# Patient Record
Sex: MALE | Race: WHITE | NOT HISPANIC OR LATINO | Employment: OTHER | URBAN - METROPOLITAN AREA
[De-identification: names, ages, dates, MRNs, and addresses within clinical notes are randomized per-mention and may not be internally consistent; named-entity substitution may affect disease eponyms.]

---

## 2023-11-01 ENCOUNTER — APPOINTMENT (OUTPATIENT)
Dept: CARDIOLOGY | Facility: CLINIC | Age: 73
End: 2023-11-01
Payer: MEDICARE

## 2023-11-07 ENCOUNTER — OFFICE VISIT (OUTPATIENT)
Dept: CARDIOLOGY | Facility: CLINIC | Age: 73
End: 2023-11-07
Payer: MEDICARE

## 2023-11-07 VITALS
TEMPERATURE: 97.9 F | HEIGHT: 72 IN | DIASTOLIC BLOOD PRESSURE: 57 MMHG | SYSTOLIC BLOOD PRESSURE: 92 MMHG | BODY MASS INDEX: 32.32 KG/M2 | WEIGHT: 238.6 LBS | HEART RATE: 79 BPM

## 2023-11-07 DIAGNOSIS — I25.810 ATHEROSCLEROSIS OF CORONARY ARTERY BYPASS GRAFT OF NATIVE HEART WITHOUT ANGINA PECTORIS: ICD-10-CM

## 2023-11-07 DIAGNOSIS — E78.2 MIXED HYPERLIPIDEMIA: ICD-10-CM

## 2023-11-07 DIAGNOSIS — I25.10 CORONARY ARTERY DISEASE INVOLVING NATIVE HEART, UNSPECIFIED VESSEL OR LESION TYPE, UNSPECIFIED WHETHER ANGINA PRESENT: Primary | ICD-10-CM

## 2023-11-07 DIAGNOSIS — I12.9 HYPERTENSION, RENAL DISEASE, STAGE 1-4 OR UNSPECIFIED CHRONIC KIDNEY DISEASE: ICD-10-CM

## 2023-11-07 PROBLEM — C85.90 LYMPHOMA (MULTI): Status: ACTIVE | Noted: 2023-11-07

## 2023-11-07 PROBLEM — Z95.1 H/O FOUR VESSEL CORONARY ARTERY BYPASS GRAFT: Status: ACTIVE | Noted: 2023-11-07

## 2023-11-07 PROCEDURE — 1160F RVW MEDS BY RX/DR IN RCRD: CPT | Performed by: INTERNAL MEDICINE

## 2023-11-07 PROCEDURE — 93010 ELECTROCARDIOGRAM REPORT: CPT | Performed by: INTERNAL MEDICINE

## 2023-11-07 PROCEDURE — 93005 ELECTROCARDIOGRAM TRACING: CPT | Performed by: INTERNAL MEDICINE

## 2023-11-07 PROCEDURE — 1159F MED LIST DOCD IN RCRD: CPT | Performed by: INTERNAL MEDICINE

## 2023-11-07 PROCEDURE — 99204 OFFICE O/P NEW MOD 45 MIN: CPT | Performed by: INTERNAL MEDICINE

## 2023-11-07 PROCEDURE — 99214 OFFICE O/P EST MOD 30 MIN: CPT | Performed by: INTERNAL MEDICINE

## 2023-11-07 PROCEDURE — 1036F TOBACCO NON-USER: CPT | Performed by: INTERNAL MEDICINE

## 2023-11-07 RX ORDER — METOPROLOL SUCCINATE 25 MG/1
25 TABLET, EXTENDED RELEASE ORAL 2 TIMES DAILY
COMMUNITY
Start: 2023-07-31 | End: 2023-11-13

## 2023-11-07 RX ORDER — AMLODIPINE BESYLATE 5 MG/1
5 TABLET ORAL DAILY
COMMUNITY
Start: 2023-10-20 | End: 2023-11-07 | Stop reason: ALTCHOICE

## 2023-11-07 RX ORDER — ASPIRIN 81 MG/1
81 TABLET ORAL DAILY
COMMUNITY

## 2023-11-07 RX ORDER — TAMOXIFEN CITRATE 20 MG/1
20 TABLET ORAL DAILY
COMMUNITY
Start: 2023-08-31

## 2023-11-07 RX ORDER — ATORVASTATIN CALCIUM 40 MG/1
40 TABLET, FILM COATED ORAL DAILY
COMMUNITY
Start: 2023-10-13 | End: 2024-04-29 | Stop reason: SDUPTHER

## 2023-11-07 NOTE — PROGRESS NOTES
Chief Complaint:   Coronary Artery Disease     History Of Present Illness:    Bryant Romeo is a 73 y.o. male presenting with coronary artery disease.  He had a history of angina and underwent coronary artery bypass surgery.  The patient is tolerating guideline-directed medical therapy with antiplatelet and statin medication and is compliant.  The patient exercises regularly and follows a heart healthy diet.  The patient has been well since their last office appointment and is not having any anginal symptoms or dyspnea on exertion.      Review of Systems  All pertinent systems have been reviewed and are negative except for what is stated in the history of present illness.    All other systems have been reviewed and are negative and noncontributory to this patient's current ailments.   .     Last Recorded Vitals:  Visit Vitals  BP 92/57 (BP Location: Right arm)   Pulse 79   Temp 36.6 °C (97.9 °F)   Ht 1.829 m (6')   Wt 108 kg (238 lb 9.6 oz)   BMI 32.36 kg/m²   Smoking Status Never   BSA 2.34 m²        Past Medical History:  He has a past medical history of Atherosclerosis of coronary artery bypass graft of native heart without angina pectoris (11/07/2023), H/O four vessel coronary artery bypass graft (11/07/2023), Hypertension, renal disease, stage 1-4 or unspecified chronic kidney disease (11/07/2023), and Mixed hyperlipidemia (11/07/2023).    Past Surgical History:  He has no past surgical history on file.      Social History:  He reports that he has never smoked. He has never used smokeless tobacco. No history on file for alcohol use and drug use.    Family History:  No family history on file.     Allergies:  Demerol [meperidine]    Outpatient Medications:  Current Outpatient Medications   Medication Instructions    amLODIPine (NORVASC) 5 mg, oral, Daily    aspirin 81 mg, oral, Daily    atorvastatin (LIPITOR) 40 mg, oral, Daily    metoprolol succinate XL (TOPROL-XL) 25 mg, oral, 2 times daily    tamoxifen  "(NOLVADEX) 20 mg, oral, Daily       Physical Exam:  Physical Exam  Vitals reviewed.   Constitutional:       General: He is not in acute distress.     Appearance: Normal appearance.   HENT:      Head: Normocephalic and atraumatic.      Nose: Nose normal.   Eyes:      Conjunctiva/sclera: Conjunctivae normal.   Cardiovascular:      Rate and Rhythm: Normal rate and regular rhythm.      Pulses: Normal pulses.      Heart sounds: No murmur heard.  Pulmonary:      Effort: Pulmonary effort is normal. No respiratory distress.      Breath sounds: Normal breath sounds. No wheezing, rhonchi or rales.   Abdominal:      General: Bowel sounds are normal. There is no distension.      Palpations: Abdomen is soft.      Tenderness: There is no abdominal tenderness.   Musculoskeletal:         General: No swelling.      Right lower leg: No edema.      Left lower leg: No edema.   Skin:     General: Skin is warm and dry.      Capillary Refill: Capillary refill takes less than 2 seconds.   Neurological:      General: No focal deficit present.      Mental Status: He is alert.   Psychiatric:         Mood and Affect: Mood normal.            Last Labs:  CBC -  No results found for: \"WBC\", \"HGB\", \"HCT\", \"MCV\", \"PLT\"    CMP -  No results found for: \"CALCIUM\", \"PHOS\", \"PROT\", \"ALBUMIN\", \"AST\", \"ALT\", \"ALKPHOS\", \"BILITOT\"    LIPID PANEL -   No results found for: \"CHOL\", \"HDL\", \"CHHDL\", \"LDL\", \"VLDL\", \"TRIG\", \"NHDL\"    RENAL FUNCTION PANEL -   No results found for: \"GLU\", \"K\", \"CHLOR\", \"PHOS\"    No results found for: \"BNP\", \"HGBA1C\"    Last Cardiology Tests:  ECG:  No results found for this or any previous visit from the past 1095 days.  ECG: NSR  Echo:  No echocardiogram results found for the past 12 months     Assessment/Plan     1. Coronary artery disease involving native heart, unspecified vessel or lesion type, unspecified whether angina present  CAD: The patient's CAD, as detailed in the HPI, has been clinically stable, without any anginal " symptoms or dyspnea.  The patient will continue treatment with guideline-directed medical therapy with antiplatelet and statin medications - ECG 12 lead (Clinic Performed)  - Follow Up In Cardiology; Future    2. Mixed hyperlipidemia  Hyperlipidemia: The patient's lipids are well controlled on chronic statin therapy and they are meeting their goal LDL cholesterol per the ACC/AHA guidelines.  The patient is compliant and tolerating statin therapy a    - Follow Up In Cardiology; Future      4. Hypertension, renal disease, stage 1-4 or unspecified chronic kidney disease  ASX low BP.  Will hold amlodipine.  - Follow Up In Cardiology; Future       Giovanni Cazares MD    Exclusive of any other services or procedures performed, I, Giovanni Cazares MD , spent 30 minutes in duration for this visit today.  This time consisted of chart review, obtaining history, and/or performing the exam as documented above as well as documenting the clinical information for the encounter in the electronic record, discussing treatment options, plans, and/or goals with patient, family, and/or caregiver, refilling medications, updating the electronic record, ordering medicines, lab work, imaging, referrals, and/or procedures as documented above and communicating with other [facility] professionals. I have discussed the results of laboratory, radiology, and cardiology studies with the patient and their family/caregiver.

## 2023-11-08 LAB
ATRIAL RATE: 74 BPM
P AXIS: 57 DEGREES
P OFFSET: 154 MS
P ONSET: 97 MS
PR INTERVAL: 264 MS
Q ONSET: 229 MS
QRS COUNT: 12 BEATS
QRS DURATION: 74 MS
QT INTERVAL: 390 MS
QTC CALCULATION(BAZETT): 432 MS
QTC FREDERICIA: 418 MS
R AXIS: 33 DEGREES
T AXIS: 19 DEGREES
T OFFSET: 424 MS
VENTRICULAR RATE: 74 BPM

## 2023-11-12 DIAGNOSIS — I10 HYPERTENSION, UNSPECIFIED TYPE: Primary | ICD-10-CM

## 2023-11-13 RX ORDER — METOPROLOL SUCCINATE 25 MG/1
25 TABLET, EXTENDED RELEASE ORAL 2 TIMES DAILY
Qty: 180 TABLET | Refills: 1 | Status: SHIPPED | OUTPATIENT
Start: 2023-11-13 | End: 2024-04-29 | Stop reason: SDUPTHER

## 2024-04-29 ENCOUNTER — OFFICE VISIT (OUTPATIENT)
Dept: PRIMARY CARE | Facility: CLINIC | Age: 74
End: 2024-04-29
Payer: MEDICARE

## 2024-04-29 VITALS
BODY MASS INDEX: 32.41 KG/M2 | HEART RATE: 83 BPM | WEIGHT: 239 LBS | RESPIRATION RATE: 16 BRPM | SYSTOLIC BLOOD PRESSURE: 98 MMHG | TEMPERATURE: 97.8 F | DIASTOLIC BLOOD PRESSURE: 64 MMHG

## 2024-04-29 DIAGNOSIS — I10 HYPERTENSION, UNSPECIFIED TYPE: ICD-10-CM

## 2024-04-29 DIAGNOSIS — C50.929 MALIGNANT NEOPLASM OF MALE BREAST, UNSPECIFIED ESTROGEN RECEPTOR STATUS, UNSPECIFIED LATERALITY, UNSPECIFIED SITE OF BREAST (MULTI): ICD-10-CM

## 2024-04-29 DIAGNOSIS — I12.9 HYPERTENSION, RENAL DISEASE, STAGE 1-4 OR UNSPECIFIED CHRONIC KIDNEY DISEASE: ICD-10-CM

## 2024-04-29 DIAGNOSIS — I25.810 ATHEROSCLEROSIS OF CORONARY ARTERY BYPASS GRAFT OF NATIVE HEART WITHOUT ANGINA PECTORIS: ICD-10-CM

## 2024-04-29 DIAGNOSIS — Z12.5 PROSTATE CANCER SCREENING: ICD-10-CM

## 2024-04-29 DIAGNOSIS — Z00.00 ENCOUNTER FOR ANNUAL WELLNESS VISIT (AWV) IN MEDICARE PATIENT: Primary | ICD-10-CM

## 2024-04-29 DIAGNOSIS — C85.90 LYMPHOMA, UNSPECIFIED BODY REGION, UNSPECIFIED LYMPHOMA TYPE (MULTI): ICD-10-CM

## 2024-04-29 DIAGNOSIS — R73.9 HYPERGLYCEMIA: ICD-10-CM

## 2024-04-29 DIAGNOSIS — Z12.5 SPECIAL SCREENING, PROSTATE CANCER: ICD-10-CM

## 2024-04-29 DIAGNOSIS — Z95.1 H/O FOUR VESSEL CORONARY ARTERY BYPASS GRAFT: ICD-10-CM

## 2024-04-29 DIAGNOSIS — E78.2 MIXED HYPERLIPIDEMIA: ICD-10-CM

## 2024-04-29 PROCEDURE — 1159F MED LIST DOCD IN RCRD: CPT | Performed by: FAMILY MEDICINE

## 2024-04-29 PROCEDURE — 99213 OFFICE O/P EST LOW 20 MIN: CPT | Performed by: FAMILY MEDICINE

## 2024-04-29 PROCEDURE — 3078F DIAST BP <80 MM HG: CPT | Performed by: FAMILY MEDICINE

## 2024-04-29 PROCEDURE — 1036F TOBACCO NON-USER: CPT | Performed by: FAMILY MEDICINE

## 2024-04-29 PROCEDURE — 3074F SYST BP LT 130 MM HG: CPT | Performed by: FAMILY MEDICINE

## 2024-04-29 PROCEDURE — G0439 PPPS, SUBSEQ VISIT: HCPCS | Performed by: FAMILY MEDICINE

## 2024-04-29 PROCEDURE — 1170F FXNL STATUS ASSESSED: CPT | Performed by: FAMILY MEDICINE

## 2024-04-29 RX ORDER — AMLODIPINE BESYLATE 2.5 MG/1
2.5 TABLET ORAL DAILY
Qty: 90 TABLET | Refills: 3 | Status: SHIPPED | OUTPATIENT
Start: 2024-04-29 | End: 2025-04-29

## 2024-04-29 RX ORDER — AMLODIPINE BESYLATE 5 MG/1
5 TABLET ORAL DAILY
COMMUNITY
Start: 2024-02-09 | End: 2024-04-29 | Stop reason: WASHOUT

## 2024-04-29 RX ORDER — METOPROLOL SUCCINATE 25 MG/1
25 TABLET, EXTENDED RELEASE ORAL DAILY
Qty: 90 TABLET | Refills: 3 | Status: SHIPPED | OUTPATIENT
Start: 2024-04-29 | End: 2025-04-29

## 2024-04-29 RX ORDER — ATORVASTATIN CALCIUM 40 MG/1
40 TABLET, FILM COATED ORAL DAILY
Qty: 90 TABLET | Refills: 3 | Status: SHIPPED | OUTPATIENT
Start: 2024-04-29 | End: 2025-04-29

## 2024-04-29 ASSESSMENT — PATIENT HEALTH QUESTIONNAIRE - PHQ9
SUM OF ALL RESPONSES TO PHQ9 QUESTIONS 1 AND 2: 0
1. LITTLE INTEREST OR PLEASURE IN DOING THINGS: NOT AT ALL
2. FEELING DOWN, DEPRESSED OR HOPELESS: NOT AT ALL

## 2024-04-29 ASSESSMENT — ENCOUNTER SYMPTOMS
OCCASIONAL FEELINGS OF UNSTEADINESS: 1
DEPRESSION: 0
LOSS OF SENSATION IN FEET: 1

## 2024-04-29 ASSESSMENT — ACTIVITIES OF DAILY LIVING (ADL)
TAKING_MEDICATION: INDEPENDENT
DOING_HOUSEWORK: INDEPENDENT
BATHING: INDEPENDENT
GROCERY_SHOPPING: INDEPENDENT
MANAGING_FINANCES: INDEPENDENT
DRESSING: INDEPENDENT

## 2024-04-29 NOTE — PROGRESS NOTES
Subjective   Patient ID: Bryant Romeo is a 74 y.o. male who presents for Medicare Annual Wellness Visit Subsequent (Needs referral for oncologist ).  HPI  Patient with past medical hx of breast cancer he had 3 breast surgery 1980 hodgkin lymphoma , 2001 he had none hodgkin ,  5 years ago he had breast cancer requiring double mastectomy , HTN , HLD, CAD status post CABG 4 years ago presenting to establish care.   patient recently relocated from [facility] with his wife , to help her sister.   he still wants to follow with his oncologist she had Hermelinda Contreras oncologist.  no mets with he cancers.     Got braces , and wants to live with it for right.     Wants to esatblish with an oncololgist local       he will taking the tamoxifen for almost 5 years, they will decide next visit.     He had quadruple bypass , 4 years ago, right after the breast cancer, it was not emergent , he went for cardiac cath for stents , there is so quadruple , [facility], no kidney issues , he have not seen a cardiolgoist,     patient recently relocated from [facility]      he recently had abdominal pain , admitted for cholecysitits requiring urgent surgery , complicated by infection  , followed by stay at [facility] , and he is still having weakness in his legs and difficulty walking.       uptodate on colon cancer screening , ketty 2 years ago , will order the old records.      He had to do Michele Najera ,     no prostate issues ,     Nephrology Juanjose Constantino   Neurosurgery Dr. Najera   Neurolsoy Dr. Emerson   Cardiology Dr. Cazares     Did not smoke   1 aunt of breast cancer in the 60s or 70s .     Does not want colonscopy     Review of Systems    Past Medical History:   Diagnosis Date    Atherosclerosis of coronary artery bypass graft of native heart without angina pectoris 11/07/2023    H/O four vessel coronary artery bypass graft 11/07/2023    Hypertension, renal disease, stage 1-4 or unspecified chronic kidney  disease 11/07/2023    Mixed hyperlipidemia 11/07/2023       History reviewed. No pertinent surgical history.   Social History     Socioeconomic History    Marital status:      Spouse name: None    Number of children: None    Years of education: None    Highest education level: None   Occupational History    None   Tobacco Use    Smoking status: Never    Smokeless tobacco: Never   Substance and Sexual Activity    Alcohol use: None    Drug use: None    Sexual activity: None   Other Topics Concern    None   Social History Narrative    None     Social Determinants of Health     Financial Resource Strain: Not on file   Food Insecurity: Not on file   Transportation Needs: Not on file   Physical Activity: Not on file   Stress: Not on file   Social Connections: Not on file   Intimate Partner Violence: Not on file   Housing Stability: Not on file      No family history on file.    MEDICATIONS AND ALLERGIES:    ALLERGIES Demerol [meperidine]    MEDICATIONS   Current Outpatient Medications on File Prior to Visit   Medication Sig Dispense Refill    amLODIPine (Norvasc) 5 mg tablet Take 1 tablet (5 mg) by mouth once daily.      aspirin 81 mg EC tablet Take 1 tablet (81 mg) by mouth once daily.      atorvastatin (Lipitor) 40 mg tablet Take 1 tablet (40 mg) by mouth once daily.      tamoxifen (Nolvadex) 20 mg tablet Take 1 tablet (20 mg total) by mouth once daily.      metoprolol succinate XL (Toprol-XL) 25 mg 24 hr tablet TAKE 1 TABLET BY MOUTH TWICE DAILY 180 tablet 1     No current facility-administered medications on file prior to visit.        Objective   Visit Vitals  BP 98/64   Pulse 83   Temp 36.6 °C (97.8 °F) (Temporal)   Resp 16   Wt 108 kg (239 lb)   BMI 32.41 kg/m²   Smoking Status Never   BSA 2.34 m²          11/7/2023     1:08 PM 4/29/2024    10:22 AM   Vitals   Systolic 92 98   Diastolic 57 64   Heart Rate 79 83   Temp 36.6 °C (97.9 °F) 36.6 °C (97.8 °F)   Resp  16   Height (in) 1.829 m (6')    Weight (lb)  238.6 239   BMI 32.36 kg/m2 32.41 kg/m2   BSA (m2) 2.34 m2 2.34 m2   Visit Report Report Report     Physical Exam  Constitutional:       Appearance: Normal appearance. He is normal weight.      Comments: Ambulating with a walker, has bilatearal ankle brace. In place.    HENT:      Head: Normocephalic.      Right Ear: Tympanic membrane normal.      Left Ear: Tympanic membrane normal.      Nose: Nose normal.      Mouth/Throat:      Pharynx: Oropharynx is clear.   Eyes:      Pupils: Pupils are equal, round, and reactive to light.   Cardiovascular:      Rate and Rhythm: Normal rate and regular rhythm.      Pulses: Normal pulses.      Heart sounds: Normal heart sounds.   Pulmonary:      Effort: Pulmonary effort is normal.      Breath sounds: Normal breath sounds. No stridor. No rhonchi.   Musculoskeletal:         General: No swelling or tenderness.   Skin:     Coloration: Skin is not jaundiced or pale.   Neurological:      General: No focal deficit present.      Mental Status: He is alert and oriented to person, place, and time. Mental status is at baseline.      Cranial Nerves: No cranial nerve deficit.      Sensory: No sensory deficit.      Motor: No weakness.      Coordination: Coordination normal.      Gait: Gait normal.      Deep Tendon Reflexes: Reflexes normal.   Psychiatric:         Mood and Affect: Mood normal.         Behavior: Behavior normal.         Thought Content: Thought content normal.         Judgment: Judgment normal.         1. Encounter for annual wellness visit (AWV) in Medicare patient  Risk Factors Identified During Visit: several medical issues, post CVA , spinal stenosis , weakness in his legs.   Influenza:    Pneumovax 23:  Prevnar: reported that he completed them in [place].   Shingles Vaccine: advised  Prostate cancer screening: will order   Colorectal Cancer Screening: does not desire dure to age and comorbidity.   Abdominal Aortic Aneurysm screening: never smoked  Code status :  full  code   - Hemoglobin A1C; Future  - Lipid panel; Future  - Prostate Specific Antigen; Future  - Comprehensive metabolic panel; Future    2. Hypertension, renal disease, stage 1-4 or unspecified chronic kidney disease  Followed with Dr. Gunn nephrology   - Hemoglobin A1C; Future  - Lipid panel; Future  - Prostate Specific Antigen; Future  - Comprehensive metabolic panel; Future    3. Mixed hyperlipidemia  Will continue current treatment.   - atorvastatin (Lipitor) 40 mg tablet; Take 1 tablet (40 mg) by mouth once daily.  Dispense: 90 tablet; Refill: 3  - Hemoglobin A1C; Future  - Lipid panel; Future  - Prostate Specific Antigen; Future  - Comprehensive metabolic panel; Future    4. Atherosclerosis of coronary artery bypass graft of native heart without angina pectoris  No acute symptosm   Follwoed with Dr. Cazares   - Hemoglobin A1C; Future  - Lipid panel; Future  - Prostate Specific Antigen; Future  - Comprehensive metabolic panel; Future    5. Lymphoma, unspecified body region, unspecified lymphoma type (Multi)  Past hx of lymphoma , will refer to oncology   - Referral to Hematology and Oncology; Future  - Hemoglobin A1C; Future  - Lipid panel; Future  - Prostate Specific Antigen; Future  - Comprehensive metabolic panel; Future    6. H/O four vessel coronary artery bypass graft  Stable   No acute complaints.   - Hemoglobin A1C; Future  - Lipid panel; Future  - Prostate Specific Antigen; Future  - Comprehensive metabolic panel; Future    7. Malignant neoplasm of male breast, unspecified estrogen receptor status, unspecified laterality, unspecified site of breast (Multi)  Post mastectomy   Will refer to oncology   - Referral to Hematology and Oncology; Future  - Hemoglobin A1C; Future  - Lipid panel; Future  - Prostate Specific Antigen; Future  - Comprehensive metabolic panel; Future    8. Hypertension, unspecified type  Stable today , will continue current treatment.   - metoprolol succinate XL (Toprol-XL) 25 mg 24  hr tablet; Take 1 tablet (25 mg) by mouth once daily.  Dispense: 90 tablet; Refill: 3  - amLODIPine (Norvasc) 2.5 mg tablet; Take 1 tablet (2.5 mg) by mouth once daily.  Dispense: 90 tablet; Refill: 3  - Hemoglobin A1C; Future  - Lipid panel; Future  - Prostate Specific Antigen; Future  - Comprehensive metabolic panel; Future    9. Prostate cancer screening    - Hemoglobin A1C; Future  - Lipid panel; Future  - Prostate Specific Antigen; Future  - Comprehensive metabolic panel; Future    10. Special screening, prostate cancer    - Hemoglobin A1C; Future  - Lipid panel; Future  - Prostate Specific Antigen; Future  - Comprehensive metabolic panel; Future    11. Hyperglycemia    - Hemoglobin A1C; Future

## 2024-04-29 NOTE — PATIENT INSTRUCTIONS
Dr. Jerad Liu or Dr. Delilah Gutierres.     [address] [place] [address]  [address]  [place] [id number]    [address] [place] [address] [place] [address]    P: [phone]  F: [phone]

## 2024-05-09 ENCOUNTER — LAB (OUTPATIENT)
Dept: LAB | Facility: LAB | Age: 74
End: 2024-05-09
Payer: MEDICARE

## 2024-05-09 DIAGNOSIS — Z12.5 SPECIAL SCREENING, PROSTATE CANCER: ICD-10-CM

## 2024-05-09 DIAGNOSIS — R73.9 HYPERGLYCEMIA: ICD-10-CM

## 2024-05-09 DIAGNOSIS — I12.9 HYPERTENSION, RENAL DISEASE, STAGE 1-4 OR UNSPECIFIED CHRONIC KIDNEY DISEASE: ICD-10-CM

## 2024-05-09 DIAGNOSIS — Z95.1 H/O FOUR VESSEL CORONARY ARTERY BYPASS GRAFT: ICD-10-CM

## 2024-05-09 DIAGNOSIS — I10 HYPERTENSION, UNSPECIFIED TYPE: ICD-10-CM

## 2024-05-09 DIAGNOSIS — Z12.5 PROSTATE CANCER SCREENING: ICD-10-CM

## 2024-05-09 DIAGNOSIS — Z00.00 ENCOUNTER FOR ANNUAL WELLNESS VISIT (AWV) IN MEDICARE PATIENT: ICD-10-CM

## 2024-05-09 DIAGNOSIS — C50.929 MALIGNANT NEOPLASM OF MALE BREAST, UNSPECIFIED ESTROGEN RECEPTOR STATUS, UNSPECIFIED LATERALITY, UNSPECIFIED SITE OF BREAST (MULTI): ICD-10-CM

## 2024-05-09 DIAGNOSIS — E78.2 MIXED HYPERLIPIDEMIA: ICD-10-CM

## 2024-05-09 DIAGNOSIS — I25.810 ATHEROSCLEROSIS OF CORONARY ARTERY BYPASS GRAFT OF NATIVE HEART WITHOUT ANGINA PECTORIS: ICD-10-CM

## 2024-05-09 DIAGNOSIS — C85.90 LYMPHOMA, UNSPECIFIED BODY REGION, UNSPECIFIED LYMPHOMA TYPE (MULTI): ICD-10-CM

## 2024-05-09 LAB
ALBUMIN SERPL BCP-MCNC: 3.5 G/DL (ref 3.4–5)
ALP SERPL-CCNC: 75 U/L (ref 33–136)
ALT SERPL W P-5'-P-CCNC: 8 U/L (ref 10–52)
ANION GAP SERPL CALC-SCNC: 15 MMOL/L (ref 10–20)
AST SERPL W P-5'-P-CCNC: 10 U/L (ref 9–39)
BILIRUB SERPL-MCNC: 0.4 MG/DL (ref 0–1.2)
BUN SERPL-MCNC: 25 MG/DL (ref 6–23)
CALCIUM SERPL-MCNC: 9.7 MG/DL (ref 8.6–10.3)
CHLORIDE SERPL-SCNC: 108 MMOL/L (ref 98–107)
CHOLEST SERPL-MCNC: 159 MG/DL (ref 0–199)
CHOLESTEROL/HDL RATIO: 3.3
CO2 SERPL-SCNC: 24 MMOL/L (ref 21–32)
CREAT SERPL-MCNC: 1.64 MG/DL (ref 0.5–1.3)
EGFRCR SERPLBLD CKD-EPI 2021: 43 ML/MIN/1.73M*2
GLUCOSE SERPL-MCNC: 104 MG/DL (ref 74–99)
HDLC SERPL-MCNC: 48.7 MG/DL
LDLC SERPL CALC-MCNC: 83 MG/DL
NON HDL CHOLESTEROL: 110 MG/DL (ref 0–149)
POTASSIUM SERPL-SCNC: 4.5 MMOL/L (ref 3.5–5.3)
PROT SERPL-MCNC: 6.5 G/DL (ref 6.4–8.2)
PSA SERPL-MCNC: 0.27 NG/ML
SODIUM SERPL-SCNC: 142 MMOL/L (ref 136–145)
TRIGL SERPL-MCNC: 137 MG/DL (ref 0–149)
VLDL: 27 MG/DL (ref 0–40)

## 2024-05-09 PROCEDURE — 83036 HEMOGLOBIN GLYCOSYLATED A1C: CPT

## 2024-05-09 PROCEDURE — 80053 COMPREHEN METABOLIC PANEL: CPT

## 2024-05-09 PROCEDURE — G0103 PSA SCREENING: HCPCS

## 2024-05-09 PROCEDURE — 36415 COLL VENOUS BLD VENIPUNCTURE: CPT

## 2024-05-09 PROCEDURE — 80061 LIPID PANEL: CPT

## 2024-05-10 LAB
EST. AVERAGE GLUCOSE BLD GHB EST-MCNC: 120 MG/DL
HBA1C MFR BLD: 5.8 %

## 2024-06-12 ENCOUNTER — OFFICE VISIT (OUTPATIENT)
Dept: PRIMARY CARE | Facility: CLINIC | Age: 74
End: 2024-06-12
Payer: MEDICARE

## 2024-06-12 VITALS
DIASTOLIC BLOOD PRESSURE: 63 MMHG | HEIGHT: 72 IN | SYSTOLIC BLOOD PRESSURE: 99 MMHG | RESPIRATION RATE: 16 BRPM | HEART RATE: 81 BPM | BODY MASS INDEX: 31.15 KG/M2 | OXYGEN SATURATION: 93 % | WEIGHT: 230 LBS

## 2024-06-12 DIAGNOSIS — J06.9 UPPER RESPIRATORY TRACT INFECTION, UNSPECIFIED TYPE: Primary | ICD-10-CM
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
## 2024-06-12 PROCEDURE — 1159F MED LIST DOCD IN RCRD: CPT | Performed by: FAMILY MEDICINE

## 2024-06-12 PROCEDURE — 1036F TOBACCO NON-USER: CPT | Performed by: FAMILY MEDICINE

## 2024-06-12 PROCEDURE — 99214 OFFICE O/P EST MOD 30 MIN: CPT | Performed by: FAMILY MEDICINE

## 2024-06-12 RX ORDER — AMOXICILLIN 500 MG/1
500 CAPSULE ORAL EVERY 12 HOURS SCHEDULED
Qty: 14 CAPSULE | Refills: 0 | Status: SHIPPED | OUTPATIENT
Start: 2024-06-12 | End: 2024-06-19

## 2024-06-12 NOTE — PROGRESS NOTES
Subjective   Patient ID: Bryant Romeo is a 74 y.o. male who presents for Follow-up ([facility] 6/8/24).  HPIPatient with hx of CAD , breast cancer bilateral mstectomy , lymphoma, presenting for follow up from recent urgent care visit.     PATIENT WAS HAVING RATLE IN HIS CHEST with cough so he went to the urgent care on Saturday 5 days ago , he was treated with prednisone that he completed yesterday and cough syrup , the cough syrup made him feel dizzy and light headed , was not feeling well yesterday , he almost went to the ED, however this morning he is feeling 50% better and the cough has improved.   He stopped the cough syrup because it was making him feel dizzy . ( Bromphenirammine/pseudophed/DM)     Cough is a lot better.   On exam he had rhonchi on the R lower lobe with wheezing. So we ordered chest Xray and we were going to start amoxicillin adjusted to his kidney function.     On his way out office , when he got the waiting room , he felt dizzy and his legs gave up on him , so he slowly fell into the ground , he did not hit head , however he scraped his Left knee causing a laceration  , when he first got to the ground , he was limp and has convulsions , but no post ictal symptoms.   We called the emergency services.   His sugar was 91   Blood pressure was 120/70   Heart rate was normal 87   O2 sat was 97%   He was not having any pain , as there was no sever truama as he slowly fell ,after examining the paitient , there was no acute bony tenderness, so we helped him into a wheel chair until the ambulance arrived within 5-10 minutes   He will be taken to [facility]   Review of Systems    Past Medical History:   Diagnosis Date    Atherosclerosis of coronary artery bypass graft of native heart without angina pectoris 11/07/2023    H/O four vessel coronary artery bypass graft 11/07/2023    Hypertension, renal disease, stage 1-4 or unspecified chronic kidney disease 11/07/2023    Mixed  "hyperlipidemia 11/07/2023       History reviewed. No pertinent surgical history.   Social History     Socioeconomic History    Marital status:      Spouse name: None    Number of children: None    Years of education: None    Highest education level: None   Occupational History    None   Tobacco Use    Smoking status: Never    Smokeless tobacco: Never   Substance and Sexual Activity    Alcohol use: None    Drug use: None    Sexual activity: None   Other Topics Concern    None   Social History Narrative    None     Social Determinants of Health     Financial Resource Strain: Not on file   Food Insecurity: Not on file   Transportation Needs: Not on file   Physical Activity: Not on file   Stress: Not on file   Social Connections: Not on file   Intimate Partner Violence: Not on file   Housing Stability: Not on file      No family history on file.    MEDICATIONS AND ALLERGIES:    ALLERGIES Demerol [meperidine]    MEDICATIONS   Current Outpatient Medications on File Prior to Visit   Medication Sig Dispense Refill    amLODIPine (Norvasc) 2.5 mg tablet Take 1 tablet (2.5 mg) by mouth once daily. 90 tablet 3    aspirin 81 mg EC tablet Take 1 tablet (81 mg) by mouth once daily.      atorvastatin (Lipitor) 40 mg tablet Take 1 tablet (40 mg) by mouth once daily. 90 tablet 3    metoprolol succinate XL (Toprol-XL) 25 mg 24 hr tablet Take 1 tablet (25 mg) by mouth once daily. 90 tablet 3    tamoxifen (Nolvadex) 20 mg tablet Take 1 tablet (20 mg total) by mouth once daily.       No current facility-administered medications on file prior to visit.        Objective   Visit Vitals  BP 99/63   Pulse 81   Resp 16   Ht 1.829 m (6' 0.01\")   Wt 104 kg (230 lb)   SpO2 93%   BMI 31.19 kg/m²   Smoking Status Never   BSA 2.3 m²          11/7/2023     1:08 PM 4/29/2024    10:22 AM 6/12/2024     4:05 PM   Vitals   Systolic 92 98 99   Diastolic 57 64 63   Heart Rate 79 83 81   Temp 36.6 °C (97.9 °F) 36.6 °C (97.8 °F)    Resp  16 16   Height " "(in) 1.829 m (6')  1.829 m (6' 0.01\")   Weight (lb) 238.6 239 230   BMI 32.36 kg/m2 32.41 kg/m2 31.19 kg/m2   BSA (m2) 2.34 m2 2.34 m2 2.3 m2   Visit Report Report Report Report       Physical exam :  Rhonchi heard on the R lower llung   Unsteady gait   After the fall , he sustained Left leg laceration of the L knee  , that was cleaned we used triple antibiotics and bndage to stop the laceration , the bleeding stopped, there were minimal blood loss.         1. Upper respiratory tract infection, unspecified type  With rhonchi on exam , plan was to get chest Xray   Treat with amox 500mg bid   And adjust treatment as indicated.  However due to the fall , we called emergency services and the patient was taken to the ED   - amoxicillin (Amoxil) 500 mg capsule; Take 1 capsule (500 mg) by mouth every 12 hours for 7 days.  Dispense: 14 capsule; Refill: 0  - XR chest 2 views; Future    2. Fall, initial encounter       On his way out office , when he got the waiting room , he felt dizzy and his legs gave up on him , so he slowly fell into the ground , he did not hit head , however he scraped his Left knee causing a laceration  , when he first got to the ground , he was limp and has convulsions , but no post ictal symptoms.   We called the emergency services.   His sugar was 91   Blood pressure was 120/70   Heart rate was normal 87   O2 sat was 97%   He was not having any pain , as there was no sever truama as he slowly fell ,after examining the paitient , there was no acute bony tenderness, so we helped him into a wheel chair until the ambulance arrived within 5-10 minutes   He will be taken to [facility]   Wife was present during all of this and she will go with him to the ED.          "

## 2024-06-17 ENCOUNTER — PATIENT OUTREACH (OUTPATIENT)
Dept: PRIMARY CARE | Facility: CLINIC | Age: 74
End: 2024-06-17
Payer: MEDICARE

## 2024-06-17 RX ORDER — DOXYCYCLINE HYCLATE 100 MG
TABLET ORAL EVERY 12 HOURS
COMMUNITY

## 2024-06-17 RX ORDER — AMOXICILLIN 250 MG/1
500 CAPSULE ORAL EVERY 12 HOURS
COMMUNITY
Start: 2024-06-13 | End: 2024-06-19

## 2024-06-17 NOTE — PROGRESS NOTES
Discharge Facility: [facility]  Discharge Diagnosis: Pneumonia  Admission Date: 6/12/24  Discharge Date: 6/14/24    PCP Appointment Date: Pt requested to make his own appointment  Specialist Appointment Date: Unknown  Hospital Encounter and Summary: Linked     See discharge assessment below for further details    Engagement  Call Start Time: 1323 (6/17/2024  1:25 PM)    Medications  Medications reviewed with patient/caregiver?: Yes (6/17/2024  1:25 PM)  Is the patient having any side effects they believe may be caused by any medication additions or changes?: No (6/17/2024  1:25 PM)  Does the patient have all medications ordered at discharge?: Yes (6/17/2024  1:25 PM)  Care Management Interventions: No intervention needed (6/17/2024  1:25 PM)  Prescription Comments: pt sent home with script (6/17/2024  1:25 PM)  Is the patient taking all medications as directed (includes completed medication regime)?: Yes (6/17/2024  1:25 PM)  Care Management Interventions: Provided patient education (6/17/2024  1:25 PM)  Medication Comments: Pt denies problems obtaining or affording medication (6/17/2024  1:25 PM)    Appointments  Does the patient have a primary care provider?: Yes (pt requested to make his own appointment) (6/17/2024  1:25 PM)  Care Management Interventions: Educated patient on importance of making appointment (6/17/2024  1:25 PM)  Has the patient kept scheduled appointments due by today?: Yes (6/17/2024  1:25 PM)  Care Management Interventions: Educated on importance of keeping appointment (6/17/2024  1:25 PM)    Self Management  What is the home health agency?: [facility] (6/17/2024  1:25 PM)  Has home health visited the patient within 72 hours of discharge?: Yes (6/17/2024  1:25 PM)    Patient Teaching  Does the patient have access to their discharge instructions?: Yes (6/17/2024  1:25 PM)  Care Management Interventions: Reviewed instructions with patient (6/17/2024  1:25 PM)  What is the patient's  perception of their health status since discharge?: Improving (6/17/2024  1:25 PM)  Is the patient/caregiver able to teach back the hierarchy of who to call/visit for symptoms/problems? PCP, Specialist, Home Health nurse, Urgent Care, ED, 911: Yes (6/17/2024  1:25 PM)    Wrap Up  Wrap Up Additional Comments: This CM spoke with pt via phone. Pt reports doing well at home since discharge. New meds reviewed. Pt denies CP and SOB. Pt aware of my availability for non-emergent concerns. Contact info provided to patient (6/17/2024  1:25 PM)      Juan Jose Leal LPN

## 2024-06-24 ENCOUNTER — APPOINTMENT (OUTPATIENT)
Dept: PRIMARY CARE | Facility: CLINIC | Age: 74
End: 2024-06-24
Payer: MEDICARE

## 2024-06-24 VITALS
TEMPERATURE: 97.3 F | HEIGHT: 72 IN | HEART RATE: 76 BPM | SYSTOLIC BLOOD PRESSURE: 140 MMHG | OXYGEN SATURATION: 97 % | DIASTOLIC BLOOD PRESSURE: 84 MMHG | BODY MASS INDEX: 31.19 KG/M2

## 2024-06-24 DIAGNOSIS — J06.9 UPPER RESPIRATORY TRACT INFECTION, UNSPECIFIED TYPE: Primary | ICD-10-CM

## 2024-06-24 DIAGNOSIS — R26.9 GAIT ABNORMALITY: ICD-10-CM

## 2024-06-24 PROCEDURE — 1159F MED LIST DOCD IN RCRD: CPT | Performed by: FAMILY MEDICINE

## 2024-06-24 PROCEDURE — 1036F TOBACCO NON-USER: CPT | Performed by: FAMILY MEDICINE

## 2024-06-24 PROCEDURE — 99213 OFFICE O/P EST LOW 20 MIN: CPT | Performed by: FAMILY MEDICINE

## 2024-06-24 NOTE — PROGRESS NOTES
Subjective   Patient ID: Bryant Romeo is a 74 y.o. male who presents for Follow-up (HOSPITAL FOLLOW UP ).  HPIpatient sent to the hospital after a fall in the waiting area   He was treated for bronchitis/mucous plug   Much better now   Patient with gait abnormalities since hospitalization for cholecystitis in [place] for 5 weeks , without physical therapy during [facility] 19.     Review of Systems    Past Medical History:   Diagnosis Date    Atherosclerosis of coronary artery bypass graft of native heart without angina pectoris 11/07/2023    H/O four vessel coronary artery bypass graft 11/07/2023    Hypertension, renal disease, stage 1-4 or unspecified chronic kidney disease 11/07/2023    Mixed hyperlipidemia 11/07/2023       History reviewed. No pertinent surgical history.   Social History     Socioeconomic History    Marital status:      Spouse name: None    Number of children: None    Years of education: None    Highest education level: None   Occupational History    None   Tobacco Use    Smoking status: Never    Smokeless tobacco: Never   Substance and Sexual Activity    Alcohol use: None    Drug use: None    Sexual activity: None   Other Topics Concern    None   Social History Narrative    None     Social Determinants of Health     Financial Resource Strain: Not on file   Food Insecurity: Not on file   Transportation Needs: Not on file   Physical Activity: Not on file   Stress: Not on file   Social Connections: Not on file   Intimate Partner Violence: Not on file   Housing Stability: Not on file      No family history on file.    MEDICATIONS AND ALLERGIES:    ALLERGIES Demerol [meperidine]    MEDICATIONS   Current Outpatient Medications on File Prior to Visit   Medication Sig Dispense Refill    amLODIPine (Norvasc) 2.5 mg tablet Take 1 tablet (2.5 mg) by mouth once daily. 90 tablet 3    aspirin 81 mg EC tablet Take 1 tablet (81 mg) by mouth once daily.      atorvastatin (Lipitor) 40 mg tablet Take  "1 tablet (40 mg) by mouth once daily. 90 tablet 3    metoprolol succinate XL (Toprol-XL) 25 mg 24 hr tablet Take 1 tablet (25 mg) by mouth once daily. 90 tablet 3    tamoxifen (Nolvadex) 20 mg tablet Take 1 tablet (20 mg total) by mouth once daily.      [] amoxicillin (Amoxil) 250 mg capsule Take 2 capsules (500 mg) by mouth every 12 hours.      [] amoxicillin (Amoxil) 500 mg capsule Take 1 capsule (500 mg) by mouth every 12 hours for 7 days. 14 capsule 0    doxycycline (Vibra-Tabs) 100 mg tablet every 12 hours.       No current facility-administered medications on file prior to visit.              Objective   Visit Vitals  /84   Pulse 76   Temp 36.3 °C (97.3 °F)   Ht 1.829 m (6')   SpO2 97%   BMI 31.19 kg/m²   Smoking Status Never   BSA 2.3 m²          2023     1:08 PM 2024    10:22 AM 2024     4:05 PM 2024    11:14 AM   Vitals   Systolic 92 98 99 140   Diastolic 57 64 63 84   Heart Rate 79 83 81 76   Temp 36.6 °C (97.9 °F) 36.6 °C (97.8 °F)  36.3 °C (97.3 °F)   Resp  16 16    Height (in) 1.829 m (6')  1.829 m (6' 0.01\") 1.829 m (6')   Weight (lb) 238.6 239 230    BMI 32.36 kg/m2 32.41 kg/m2 31.19 kg/m2 31.19 kg/m2   BSA (m2) 2.34 m2 2.34 m2 2.3 m2 2.3 m2   Visit Report Report Report Report Report     Physical Exam  Constitutional: awake; alert, interactive; in no acute distress; well nourished and well developed  ENT: ears and nose were normal in appearance;  Eyes: pupils equal and round  Pulmonary: no respiratory distress and normal respiratory rhythm and effort  Skin: normal skin color and pigmentation;  Psychiatric: oriented to person, place, and time; affect was normal and the mood was normal     1. Upper respiratory tract infection, unspecified type  Improved after antibiotics   Not currently in distress     2. Gait abnormality  Since long hospitalization during COVID19 for cholecystitis  Now he has unsteady gait and requiring a walker   Offerred PT, he will think about " it and get back to us if he changes his mind.

## 2024-06-27 ENCOUNTER — PATIENT OUTREACH (OUTPATIENT)
Dept: PRIMARY CARE | Facility: CLINIC | Age: 74
End: 2024-06-27
Payer: MEDICARE

## 2024-06-27 NOTE — PROGRESS NOTES
Call regarding appt. with PCP on (6/24/24) after hospitalization.  At time of outreach call the patient feels as if their condition has (improved) since last visit.  Reviewed the PCP appointment with the pt and addressed any questions or concerns.    Juan Jose Leal LPN

## 2024-07-15 ENCOUNTER — PATIENT OUTREACH (OUTPATIENT)
Dept: PRIMARY CARE | Facility: CLINIC | Age: 74
End: 2024-07-15
Payer: MEDICARE

## 2024-07-15 NOTE — PROGRESS NOTES
Unable to reach patient for discharge follow up call.   LVM with call back number for patient to call if needed   If no voicemail available call attempts x 2 were made to contact the patient to assist with any questions or concerns patient may have.    Juan Jose Leal LPN

## 2024-07-25 ENCOUNTER — APPOINTMENT (OUTPATIENT)
Dept: HEMATOLOGY/ONCOLOGY | Facility: CLINIC | Age: 74
End: 2024-07-25
Payer: MEDICARE

## 2024-09-12 ENCOUNTER — PATIENT OUTREACH (OUTPATIENT)
Dept: PRIMARY CARE | Facility: CLINIC | Age: 74
End: 2024-09-12
Payer: MEDICARE

## 2024-09-12 NOTE — PROGRESS NOTES
Patient has met target of no readmission for (90) days post (hospital, SNF, rehab) discharge and is graduated from Transitional Care Management program at this time.    Juan Jose Leal LPN

## 2024-10-29 ENCOUNTER — APPOINTMENT (OUTPATIENT)
Dept: PRIMARY CARE | Facility: CLINIC | Age: 74
End: 2024-10-29
Payer: COMMERCIAL

## 2024-11-11 ENCOUNTER — APPOINTMENT (OUTPATIENT)
Dept: CARDIOLOGY | Facility: CLINIC | Age: 74
End: 2024-11-11
Payer: MEDICARE

## 2024-11-11 ENCOUNTER — OFFICE VISIT (OUTPATIENT)
Dept: CARDIOLOGY | Facility: CLINIC | Age: 74
End: 2024-11-11
Payer: MEDICARE

## 2024-11-11 VITALS
WEIGHT: 223 LBS | HEART RATE: 75 BPM | DIASTOLIC BLOOD PRESSURE: 48 MMHG | SYSTOLIC BLOOD PRESSURE: 79 MMHG | BODY MASS INDEX: 29.55 KG/M2 | HEIGHT: 73 IN

## 2024-11-11 DIAGNOSIS — I71.21 ANEURYSM OF ASCENDING AORTA WITHOUT RUPTURE (CMS-HCC): ICD-10-CM

## 2024-11-11 DIAGNOSIS — I12.9 HYPERTENSION, RENAL DISEASE, STAGE 1-4 OR UNSPECIFIED CHRONIC KIDNEY DISEASE: ICD-10-CM

## 2024-11-11 DIAGNOSIS — I25.10 CORONARY ARTERY DISEASE INVOLVING NATIVE HEART, UNSPECIFIED VESSEL OR LESION TYPE, UNSPECIFIED WHETHER ANGINA PRESENT: ICD-10-CM

## 2024-11-11 DIAGNOSIS — I25.810 ATHEROSCLEROSIS OF CORONARY ARTERY BYPASS GRAFT OF NATIVE HEART WITHOUT ANGINA PECTORIS: ICD-10-CM

## 2024-11-11 DIAGNOSIS — E78.2 MIXED HYPERLIPIDEMIA: ICD-10-CM

## 2024-11-11 DIAGNOSIS — I71.21 ANEURYSM OF ASCENDING AORTA WITHOUT RUPTURE (CMS-HCC): Primary | ICD-10-CM

## 2024-11-11 PROCEDURE — 99214 OFFICE O/P EST MOD 30 MIN: CPT | Performed by: INTERNAL MEDICINE

## 2024-11-11 PROCEDURE — 1160F RVW MEDS BY RX/DR IN RCRD: CPT | Performed by: INTERNAL MEDICINE

## 2024-11-11 PROCEDURE — 1036F TOBACCO NON-USER: CPT | Performed by: INTERNAL MEDICINE

## 2024-11-11 PROCEDURE — 1159F MED LIST DOCD IN RCRD: CPT | Performed by: INTERNAL MEDICINE

## 2024-11-11 RX ORDER — ATORVASTATIN CALCIUM 80 MG/1
80 TABLET, FILM COATED ORAL DAILY
Qty: 30 TABLET | Refills: 11 | Status: SHIPPED | OUTPATIENT
Start: 2024-11-11 | End: 2024-11-12

## 2024-11-11 NOTE — PROGRESS NOTES
"Chief Complaint:   Coronary Artery Disease     History of Present Illness     Bryant Romeo is a 74 y.o. male presenting with coronary artery disease.  He had a history of angina and underwent coronary artery bypass surgery x 4 in  2019 ([place]).  The patient is tolerating guideline-directed medical therapy with antiplatelet and statin medication and is compliant.  The patient exercises regularly and follows a heart healthy diet.  The patient has been well since their last office appointment and is not having any anginal symptoms or dyspnea on exertion.  Admitted to [facility] with PNA and Echo showed \"ascending aorta enlargement\" Reprt states root 3.7 cm.    Review of Systems  All pertinent systems have been reviewed and are negative except for what is stated in the history of present illness.    All other systems have been reviewed and are negative and noncontributory to this patient's current ailments.  .       Previous History     Past Medical History:  He has a past medical history of Aneurysm of ascending aorta without rupture (CMS-HCC) (11/11/2024), Atherosclerosis of coronary artery bypass graft of native heart without angina pectoris (11/07/2023), H/O four vessel coronary artery bypass graft (11/07/2023), Hypertension, renal disease, stage 1-4 or unspecified chronic kidney disease (11/07/2023), and Mixed hyperlipidemia (11/07/2023).    Past Surgical History:  He has no past surgical history on file.      Social History:  He reports that he has never smoked. He has never used smokeless tobacco. No history on file for alcohol use and drug use.    Family History:  No family history on file.     Allergies:  Demerol [meperidine]    Outpatient Medications:  Current Outpatient Medications   Medication Instructions    amLODIPine (NORVASC) 2.5 mg, oral, Daily    aspirin 81 mg, Daily    atorvastatin (LIPITOR) 40 mg, oral, Daily    doxycycline (Vibra-Tabs) 100 mg tablet Every 12 hours    metoprolol succinate XL (TOPROL-XL) 25 " "mg, oral, Daily    tamoxifen (NOLVADEX) 20 mg, Daily       Physical Examination   Vitals:  Visit Vitals  BP (!) 79/48 (BP Location: Right arm, Patient Position: Sitting, BP Cuff Size: Large adult)   Pulse 75   Ht 1.854 m (6' 1\")   Wt 101 kg (223 lb)   BMI 29.42 kg/m²   Smoking Status Never   BSA 2.28 m²    Physical Exam  Vitals reviewed.   Constitutional:       General: He is not in acute distress.     Appearance: Normal appearance.   HENT:      Head: Normocephalic and atraumatic.      Nose: Nose normal.   Eyes:      Conjunctiva/sclera: Conjunctivae normal.   Cardiovascular:      Rate and Rhythm: Normal rate and regular rhythm.      Pulses: Normal pulses.      Heart sounds: No murmur heard.  Pulmonary:      Effort: Pulmonary effort is normal. No respiratory distress.      Breath sounds: Normal breath sounds. No wheezing, rhonchi or rales.   Abdominal:      General: Bowel sounds are normal. There is no distension.      Palpations: Abdomen is soft.      Tenderness: There is no abdominal tenderness.   Musculoskeletal:         General: No swelling.      Right lower leg: No edema.      Left lower leg: No edema.   Skin:     General: Skin is warm and dry.      Capillary Refill: Capillary refill takes less than 2 seconds.   Neurological:      General: No focal deficit present.      Mental Status: He is alert.   Psychiatric:         Mood and Affect: Mood normal.             Labs/Imaging/Cardiac Studies     Last Labs:  CBC -  No results found for: \"WBC\", \"HGB\", \"HCT\", \"MCV\", \"PLT\"    CMP -  Lab Results   Component Value Date    CALCIUM 9.7 05/09/2024    PROT 6.5 05/09/2024    ALBUMIN 3.5 05/09/2024    AST 10 05/09/2024    ALT 8 (L) 05/09/2024    ALKPHOS 75 05/09/2024    BILITOT 0.4 05/09/2024       LIPID PANEL -   Lab Results   Component Value Date    CHOL 159 05/09/2024    HDL 48.7 05/09/2024    CHHDL 3.3 05/09/2024    VLDL 27 05/09/2024    TRIG 137 05/09/2024    NHDL 110 05/09/2024       RENAL FUNCTION PANEL -   Lab Results "   Component Value Date    K 4.5 05/09/2024       Lab Results   Component Value Date    HGBA1C 5.8 (H) 05/09/2024       ECG:    Echo:  No echocardiogram results found for the past 12 months       Assessment and Recommendations     Assessment/Plan   1. Mixed hyperlipidemia  Increase statin. LDL goal<70.  - Follow Up In Cardiology    2. Atherosclerosis of coronary artery bypass graft of native heart without angina pectoris  The patient's CAD, as detailed in the HPI, has been clinically stable, without any anginal symptoms or dyspnea.  The patient will continue treatment with guideline-directed medical therapy with antiplatelet and statin medications and was advised regular exercise and a heart healthy diet.       - Follow Up In Cardiology    3. Hypertension, renal disease, stage 1-4 or unspecified chronic kidney disease  He has ASX hypotension - stop amlodipine.  - Follow Up In Cardiology    4. Aneurysm of ascending aorta without rupture (CMS-HCC) (Primary)  ASX- -Echo 1 year.     Bryant Romeo will return in 1 year for an office visit with Echo.       Giovanni Cazares MD    Exclusive of any other services or procedures performed, I, Giovanni Cazares MD , spent 30 minutes in duration for this visit today.  This time consisted of chart review, obtaining history, and/or performing the exam as documented above as well as documenting the clinical information for the encounter in the electronic record, discussing treatment options, plans, and/or goals with patient, family, and/or caregiver, refilling medications, updating the electronic record, ordering medicines, lab work, imaging, referrals, and/or procedures as documented above and communicating with other [facility] professionals. I have discussed the results of laboratory, radiology, and cardiology studies with the patient and their family/caregiver.

## 2024-11-12 RX ORDER — ATORVASTATIN CALCIUM 80 MG/1
80 TABLET, FILM COATED ORAL DAILY
Qty: 90 TABLET | Refills: 3 | Status: SHIPPED | OUTPATIENT
Start: 2024-11-12

## 2025-01-13 ENCOUNTER — TELEMEDICINE (OUTPATIENT)
Dept: PRIMARY CARE | Facility: CLINIC | Age: 75
End: 2025-01-13
Payer: MEDICARE

## 2025-01-13 DIAGNOSIS — C85.90 LYMPHOMA, UNSPECIFIED BODY REGION, UNSPECIFIED LYMPHOMA TYPE (MULTI): ICD-10-CM

## 2025-01-13 DIAGNOSIS — R05.9 COUGH, UNSPECIFIED TYPE: ICD-10-CM

## 2025-01-13 DIAGNOSIS — J06.9 UPPER RESPIRATORY TRACT INFECTION, UNSPECIFIED TYPE: Primary | ICD-10-CM

## 2025-01-13 PROCEDURE — 99213 OFFICE O/P EST LOW 20 MIN: CPT | Performed by: FAMILY MEDICINE

## 2025-01-13 RX ORDER — BENZONATATE 100 MG/1
100 CAPSULE ORAL 3 TIMES DAILY PRN
Qty: 42 CAPSULE | Refills: 0 | Status: SHIPPED | OUTPATIENT
Start: 2025-01-13 | End: 2025-02-12

## 2025-01-13 RX ORDER — DOXYCYCLINE 100 MG/1
100 CAPSULE ORAL 2 TIMES DAILY
Qty: 14 CAPSULE | Refills: 0 | Status: SHIPPED | OUTPATIENT
Start: 2025-01-13 | End: 2025-01-20

## 2025-01-13 RX ORDER — CODEINE PHOSPHATE AND GUAIFENESIN 10; 100 MG/5ML; MG/5ML
5 SOLUTION ORAL EVERY 6 HOURS PRN
Qty: 120 ML | Refills: 0 | Status: SHIPPED | OUTPATIENT
Start: 2025-01-13 | End: 2025-01-20

## 2025-01-13 NOTE — ASSESSMENT & PLAN NOTE
Orders:    XR chest 2 views; Future    doxycycline (Vibramycin) 100 mg capsule; Take 1 capsule (100 mg) by mouth 2 times a day for 7 days. Take with at least 8 ounces (large glass) of water, do not lie down for 30 minutes after    benzonatate (Tessalon) 100 mg capsule; Take 1 capsule (100 mg) by mouth 3 times a day as needed for cough. Do not crush or chew.

## 2025-01-13 NOTE — PROGRESS NOTES
Virtual or Telephone Consent    An interactive audio and video telecommunication system which permits real time communications between the patient (at the originating site) and provider (at the distant site) was utilized to provide this telehealth service.   Verbal consent was requested and obtained from Bryant Romeo on this date, 01/13/25 for a telehealth visit.       Subjective   Patient ID: Bryant Romeo is a 75 y.o. male who presents for No chief complaint on file..  HPI  3 weeks ago was a bad cough   Went to urgent care   They treated it with medcicaiton   Fatigued   BROMPHEN/PSEUDO/DEXTRO HBR SYRUP  cough syrup helps him a little better.   When he lying in bed he gets cough   Sitting up not as bad.   Back throught help   Upper respiratory comes and goes.   They gave him antibitoics, predinosone , cough syrup   Not feeling short of breath , cough is not going away.   The cough is producing a little bit mucous.       Review of Systems    Past Medical History:   Diagnosis Date    Aneurysm of ascending aorta without rupture (CMS-[facility]) 11/11/2024    Atherosclerosis of coronary artery bypass graft of native heart without angina pectoris 11/07/2023    H/O four vessel coronary artery bypass graft 11/07/2023    Hypertension, renal disease, stage 1-4 or unspecified chronic kidney disease 11/07/2023    Mixed hyperlipidemia 11/07/2023       No past surgical history on file.   Social History     Socioeconomic History    Marital status:    Tobacco Use    Smoking status: Never    Smokeless tobacco: Never      No family history on file.    MEDICATIONS AND ALLERGIES:    ALLERGIES Demerol [meperidine]    MEDICATIONS   Current Outpatient Medications on File Prior to Visit   Medication Sig Dispense Refill    aspirin 81 mg EC tablet Take 1 tablet (81 mg) by mouth once daily.      atorvastatin (Lipitor) 80 mg tablet TAKE 1 TABLET(80 MG) BY MOUTH DAILY 90 tablet 3    doxycycline (Vibra-Tabs) 100 mg tablet every 12  "hours. (Patient not taking: Reported on 11/11/2024)      metoprolol succinate XL (Toprol-XL) 25 mg 24 hr tablet Take 1 tablet (25 mg) by mouth once daily. 90 tablet 3    tamoxifen (Nolvadex) 20 mg tablet Take 1 tablet (20 mg total) by mouth once daily.       No current facility-administered medications on file prior to visit.              Objective   Visit Vitals  Smoking Status Never          11/7/2023     1:08 PM 4/29/2024    10:22 AM 6/12/2024     4:05 PM 6/24/2024    11:14 AM 11/11/2024     2:10 PM   Vitals   Systolic 92 98 99 140 79   Diastolic 57 64 63 84 48   BP Location Right arm    Right arm   Heart Rate 79 83 81 76 75   Temp 36.6 °C (97.9 °F) 36.6 °C (97.8 °F)  36.3 °C (97.3 °F)    Resp  16 16     Height 1.829 m (6')  1.829 m (6' 0.01\") 1.829 m (6') 1.854 m (6' 1\")   Weight (lb) 238.6 239 230  223   BMI 32.36 kg/m2 32.41 kg/m2 31.19 kg/m2 31.19 kg/m2 29.42 kg/m2   BSA (m2) 2.34 m2 2.34 m2 2.3 m2 2.3 m2 2.28 m2   Visit Report Report Report Report Report Report     Physical Exam        Assessment & Plan  Upper respiratory tract infection, unspecified type  Not getting better   Will treat with doxy   Chest xray tessalon perles   If cough persisit , he can start guafensin/codeine   Alarming signs   Instructed pt to contact clinic is symptoms fail to improve or to return to clinic earlier if symptoms worsen   Counseled pt on warning signs of when to present to ER, they verbalized understanding.     Orders:    XR chest 2 views; Future    doxycycline (Vibramycin) 100 mg capsule; Take 1 capsule (100 mg) by mouth 2 times a day for 7 days. Take with at least 8 ounces (large glass) of water, do not lie down for 30 minutes after    benzonatate (Tessalon) 100 mg capsule; Take 1 capsule (100 mg) by mouth 3 times a day as needed for cough. Do not crush or chew.    Cough, unspecified type    Orders:    XR chest 2 views; Future    doxycycline (Vibramycin) 100 mg capsule; Take 1 capsule (100 mg) by mouth 2 times a day for 7 " days. Take with at least 8 ounces (large glass) of water, do not lie down for 30 minutes after    benzonatate (Tessalon) 100 mg capsule; Take 1 capsule (100 mg) by mouth 3 times a day as needed for cough. Do not crush or chew.    codeine-guaifenesin (Robitussin-AC)  mg/5 mL syrup; Take 5 mL by mouth every 6 hours if needed for cough for up to 7 days.    Lymphoma, unspecified body region, unspecified lymphoma type (Multi)    Orders:    XR chest 2 views; Future    doxycycline (Vibramycin) 100 mg capsule; Take 1 capsule (100 mg) by mouth 2 times a day for 7 days. Take with at least 8 ounces (large glass) of water, do not lie down for 30 minutes after    benzonatate (Tessalon) 100 mg capsule; Take 1 capsule (100 mg) by mouth 3 times a day as needed for cough. Do not crush or chew.

## 2025-04-28 DIAGNOSIS — I10 HYPERTENSION, UNSPECIFIED TYPE: ICD-10-CM

## 2025-04-28 RX ORDER — METOPROLOL SUCCINATE 25 MG/1
25 TABLET, EXTENDED RELEASE ORAL DAILY
Qty: 90 TABLET | Refills: 3 | Status: SHIPPED | OUTPATIENT
Start: 2025-04-28

## 2025-08-10 DIAGNOSIS — I71.21 ANEURYSM OF ASCENDING AORTA WITHOUT RUPTURE: ICD-10-CM

## 2025-08-10 DIAGNOSIS — I12.9 HYPERTENSION, RENAL DISEASE, STAGE 1-4 OR UNSPECIFIED CHRONIC KIDNEY DISEASE: ICD-10-CM

## 2025-08-10 DIAGNOSIS — I25.10 CORONARY ARTERY DISEASE INVOLVING NATIVE HEART, UNSPECIFIED VESSEL OR LESION TYPE, UNSPECIFIED WHETHER ANGINA PRESENT: ICD-10-CM

## 2025-08-10 DIAGNOSIS — I25.810 ATHEROSCLEROSIS OF CORONARY ARTERY BYPASS GRAFT OF NATIVE HEART WITHOUT ANGINA PECTORIS: ICD-10-CM

## 2025-08-10 DIAGNOSIS — E78.2 MIXED HYPERLIPIDEMIA: ICD-10-CM

## 2025-08-11 RX ORDER — ATORVASTATIN CALCIUM 80 MG/1
80 TABLET, FILM COATED ORAL DAILY
Qty: 90 TABLET | Refills: 0 | Status: SHIPPED | OUTPATIENT
Start: 2025-08-11